# Patient Record
(demographics unavailable — no encounter records)

---

## 2025-03-31 NOTE — CONSULT LETTER
[Dear  ___] : Dear ~TAE, [Please see my note below.] : Please see my note below. [Sincerely,] : Sincerely, [FreeTextEntry1] : Thank you for sending  BRITTNI URBINA  to me for neurological evaluation. This is an initial encounter with a new pt. [FreeTextEntry3] : Dr Ruiz

## 2025-03-31 NOTE — HISTORY OF PRESENT ILLNESS
[FreeTextEntry1] : 12 year old male with 1 year hx of fluctuating frequency of involuntary bilateral eye blinking movements. PMH -ve. On no meds. NKA. FMH -ve for epilepsy or tics. Birth: FTNSVD no complications. Walked and talked on time. Doing well in 6th grade.

## 2025-03-31 NOTE — DISCUSSION/SUMMARY
[FreeTextEntry1] : Tic disorder. Rule out neuropathology. Will get MRI brain and EEG. RTO prn. Note sent to SALVADOR Riley(PCP) advising to do BW (ASLO titer). Total clinician time spent on 3/31/2025 is 47 minutes including preparing to see the patient, obtaining and/or reviewing and confirming history, performing a medically necessary and appropriate examination, counseling and educating the patient and/or family, documenting clinical information in the EHR and communicating and/or referring to other healthcare professionals.